# Patient Record
Sex: FEMALE | Race: WHITE
[De-identification: names, ages, dates, MRNs, and addresses within clinical notes are randomized per-mention and may not be internally consistent; named-entity substitution may affect disease eponyms.]

---

## 2020-05-01 ENCOUNTER — HOSPITAL ENCOUNTER (INPATIENT)
Dept: HOSPITAL 26 - MED | Age: 45
LOS: 1 days | Discharge: HOME | DRG: 532 | End: 2020-05-02
Attending: GENERAL PRACTICE | Admitting: GENERAL PRACTICE
Payer: MEDICAID

## 2020-05-01 VITALS — BODY MASS INDEX: 29.89 KG/M2 | HEIGHT: 66 IN | WEIGHT: 186 LBS

## 2020-05-01 VITALS — SYSTOLIC BLOOD PRESSURE: 139 MMHG | DIASTOLIC BLOOD PRESSURE: 85 MMHG

## 2020-05-01 VITALS — SYSTOLIC BLOOD PRESSURE: 114 MMHG | DIASTOLIC BLOOD PRESSURE: 56 MMHG

## 2020-05-01 VITALS — DIASTOLIC BLOOD PRESSURE: 56 MMHG | SYSTOLIC BLOOD PRESSURE: 110 MMHG

## 2020-05-01 VITALS — DIASTOLIC BLOOD PRESSURE: 51 MMHG | SYSTOLIC BLOOD PRESSURE: 111 MMHG

## 2020-05-01 DIAGNOSIS — N93.8: Primary | ICD-10-CM

## 2020-05-01 DIAGNOSIS — Z83.3: ICD-10-CM

## 2020-05-01 DIAGNOSIS — N85.00: ICD-10-CM

## 2020-05-01 DIAGNOSIS — N83.202: ICD-10-CM

## 2020-05-01 DIAGNOSIS — D62: ICD-10-CM

## 2020-05-01 DIAGNOSIS — N83.201: ICD-10-CM

## 2020-05-01 DIAGNOSIS — E66.9: ICD-10-CM

## 2020-05-01 DIAGNOSIS — E11.9: ICD-10-CM

## 2020-05-01 DIAGNOSIS — D50.9: ICD-10-CM

## 2020-05-01 LAB
ANION GAP SERPL CALCULATED.3IONS-SCNC: 11.8 MMOL/L (ref 8–16)
APPEARANCE UR: CLEAR
BARBITURATES UR QL SCN: NEGATIVE NG/ML
BASOPHILS # BLD AUTO: 0.1 K/UL (ref 0–0.22)
BASOPHILS NFR BLD AUTO: 0.7 % (ref 0–2)
BENZODIAZ UR QL SCN: NEGATIVE NG/ML
BILIRUB UR QL STRIP: NEGATIVE
BUN SERPL-MCNC: 7 MG/DL (ref 7–18)
BZE UR QL SCN: NEGATIVE NG/ML
CANNABINOIDS UR QL SCN: NEGATIVE NG/ML
CHLORIDE SERPL-SCNC: 103 MMOL/L (ref 98–107)
CO2 SERPL-SCNC: 29 MMOL/L (ref 21–32)
COLOR UR: YELLOW
CREAT SERPL-MCNC: 0.8 MG/DL (ref 0.6–1.3)
EOSINOPHIL # BLD AUTO: 0.1 K/UL (ref 0–0.4)
EOSINOPHIL NFR BLD AUTO: 1.4 % (ref 0–4)
ERYTHROCYTE [DISTWIDTH] IN BLOOD BY AUTOMATED COUNT: 19.3 % (ref 11.6–13.7)
GFR SERPL CREATININE-BSD FRML MDRD: 100 ML/MIN (ref 90–?)
GLUCOSE SERPL-MCNC: 140 MG/DL (ref 74–106)
GLUCOSE UR STRIP-MCNC: NEGATIVE MG/DL
HCT VFR BLD AUTO: 20.9 % (ref 36–48)
HCT VFR BLD AUTO: 25 % (ref 36–48)
HGB BLD-MCNC: 6.4 G/DL (ref 12–16)
HGB BLD-MCNC: 8.1 G/DL (ref 12–16)
HGB UR QL STRIP: (no result)
LEUKOCYTE ESTERASE UR QL STRIP: NEGATIVE
LYMPHOCYTES # BLD AUTO: 2.4 K/UL (ref 2.5–16.5)
LYMPHOCYTES NFR BLD AUTO: 30.2 % (ref 20.5–51.1)
MAGNESIUM SERPL-MCNC: 1.8 MG/DL (ref 1.8–2.4)
MCH RBC QN AUTO: 23 PG (ref 27–31)
MCHC RBC AUTO-ENTMCNC: 31 G/DL (ref 33–37)
MCV RBC AUTO: 74.4 FL (ref 80–94)
MONOCYTES # BLD AUTO: 0.7 K/UL (ref 0.8–1)
MONOCYTES NFR BLD AUTO: 8.8 % (ref 1.7–9.3)
NEUTROPHILS # BLD AUTO: 4.6 K/UL (ref 1.8–7.7)
NEUTROPHILS NFR BLD AUTO: 58.9 % (ref 42.2–75.2)
NITRITE UR QL STRIP: NEGATIVE
OPIATES UR QL SCN: NEGATIVE NG/ML
PCP UR QL SCN: NEGATIVE NG/ML
PH UR STRIP: 6 [PH] (ref 5–9)
PHOSPHATE SERPL-MCNC: 2.8 MG/DL (ref 2.5–4.9)
PLATELET # BLD AUTO: 348 K/UL (ref 140–450)
POTASSIUM SERPL-SCNC: 3.8 MMOL/L (ref 3.5–5.1)
PROTHROMBIN TIME: 9.9 SECS (ref 10.8–13.4)
RBC # BLD AUTO: 2.81 MIL/UL (ref 4.2–5.4)
RBC #/AREA URNS HPF: (no result) /HPF (ref 0–5)
SODIUM SERPL-SCNC: 140 MMOL/L (ref 136–145)
TSH SERPL DL<=0.05 MIU/L-ACNC: 3.24 UIU/ML (ref 0.34–3.74)
WBC # BLD AUTO: 7.8 K/UL (ref 4.8–10.8)
WBC,URINE: (no result) /HPF (ref 0–5)

## 2020-05-01 PROCEDURE — 30233N1 TRANSFUSION OF NONAUTOLOGOUS RED BLOOD CELLS INTO PERIPHERAL VEIN, PERCUTANEOUS APPROACH: ICD-10-PCS | Performed by: GENERAL PRACTICE

## 2020-05-01 PROCEDURE — P9016 RBC LEUKOCYTES REDUCED: HCPCS

## 2020-05-01 RX ADMIN — SODIUM CHLORIDE SCH MLS/HR: 9 INJECTION, SOLUTION INTRAVENOUS at 15:39

## 2020-05-01 RX ADMIN — FERROUS GLUCONATE SCH MG: 324 TABLET ORAL at 17:20

## 2020-05-01 NOTE — NUR
PATIENT AWAKE AND TALKING ON THE PHONE WITH FAMILY. NO C/O PAIN. NO S/SX ACUTE DISTRESS. 
CALL LIGHT WITHIN REACH. WILL CONTINUE TO MONITOR.

## 2020-05-01 NOTE — NUR
Blood transfusion initated. Two nurses verified. Grover STACK second RN to verify. 
Blood infusing through left ac IV. Pt advised to notify if there is any 
complaints of SOB, rash or adverse affects during the transfusion.

## 2020-05-01 NOTE — NUR
RECEIVED PATIENT IN STABLE CONDITION FROM AM SHIFT NURSE FOR CONTINUITY OF CARE. 
RESPIRATIONS EVEN, UNLABORED. SKIN WARM, DRY. IV SITE NOTED TO LEFT AC 18G PATENT/INTACT, 
INFUSING FLUIDS WELL. NO C/O PAIN. NO S/SX ACUTE DISTRESS. CALL LIGHT WITHIN REACH. WILL 
CONTINUE TO MONITOR.

## 2020-05-01 NOTE — NUR
HOURLY ROUNDING. PT RESTING IN BED AND FACETIMING FAMILY. RESPIRATIONS EVEN AND UNLABORED 
WITH NO SOB OR RESPIRATORY DISTRESS. SKIN WARM AND DRY TO TOUCH. SAFETY MEASURES IN PLACE. 
WILL CONTINUE TO MONITOR

## 2020-05-01 NOTE — NUR
RECEIVED BEDSIDE REPORT FROM ED NURSE. PT RESTING IN BED. ABLE TO MAKE SOME NEEDS KNOWN. 
RESPIRATIONS EVEN AND UNLABORED WITH NO SOB OR RESPIRATORY DISTRESS. SKIN WARM AND DRY TO 
TOUCH. IV IN LAC 18G IS CLEAN, DRY, AND INTACT. MRSA SWAB COLLECTED. VITAL SIGNS: 114/56, 63 
HR, 18 RR, 97.9 TEMP, AND 99% SPO2 ON RA. SAFETY MEASURES IN PLACE. WILL CONTINUE TO MONITOR

## 2020-05-01 NOTE — NUR
VAGINAL BLEEDING X 10 DAYS, BRIGHT RED BLOOD WITH CLOTS.PT AWAKE , ALERT , 
AFIBRILE , AMBULATORY WITH STEADY GAIT, PINK PALPEBRAL CONJUNCTIVAE , ANICTERIC 
SCLERA,SCE , CBS BLF.FLAT SOFT NABS , NONTENDER ABDOMEN.



NKA

NO HX

NO RX

NO N/V/D

## 2020-05-01 NOTE — NUR
PT FINISHED ONE UNIT OF PRBC. PT TOLERATED WELL. NO EPISODES OF REACTIONS OR COMPLICATIONS. 
RESIDENT IS AWARE. SAFETY MEASURES IN PLACE. WILL CONTINUE TO MONITOR

## 2020-05-01 NOTE — NUR
Patient will be admitted to care of Dr Jefferson. Admited to [facility].  Will go to room 
106 b. Belongings list completed.  Report to gilbert alvares.

## 2020-05-01 NOTE — NUR
PATIENT REQUESTED FOR SANITARY PADS, STATED THAT THE BLEEDING HAS DECREASED SIGNIFICANTLY. 
NO C/O PAIN. NO S/SX ACUTE DISTRESS. CALL LIGHT WITHIN REACH. WILL CONTINUE TO MONITOR.

## 2020-05-02 VITALS — DIASTOLIC BLOOD PRESSURE: 59 MMHG | SYSTOLIC BLOOD PRESSURE: 122 MMHG

## 2020-05-02 VITALS — SYSTOLIC BLOOD PRESSURE: 111 MMHG | DIASTOLIC BLOOD PRESSURE: 46 MMHG

## 2020-05-02 VITALS — DIASTOLIC BLOOD PRESSURE: 62 MMHG | SYSTOLIC BLOOD PRESSURE: 126 MMHG

## 2020-05-02 LAB
ANION GAP SERPL CALCULATED.3IONS-SCNC: 10.3 MMOL/L (ref 8–16)
BASOPHILS # BLD AUTO: 0.1 K/UL (ref 0–0.22)
BASOPHILS NFR BLD AUTO: 0.6 % (ref 0–2)
BUN SERPL-MCNC: 7 MG/DL (ref 7–18)
CHLORIDE SERPL-SCNC: 105 MMOL/L (ref 98–107)
CHOLEST/HDLC SERPL: 3.8 {RATIO} (ref 1–4.5)
CO2 SERPL-SCNC: 28.6 MMOL/L (ref 21–32)
CREAT SERPL-MCNC: 0.7 MG/DL (ref 0.6–1.3)
EOSINOPHIL # BLD AUTO: 0.2 K/UL (ref 0–0.4)
EOSINOPHIL NFR BLD AUTO: 1.8 % (ref 0–4)
ERYTHROCYTE [DISTWIDTH] IN BLOOD BY AUTOMATED COUNT: 20.8 % (ref 11.6–13.7)
GFR SERPL CREATININE-BSD FRML MDRD: 117 ML/MIN (ref 90–?)
GLUCOSE SERPL-MCNC: 108 MG/DL (ref 74–106)
HCT VFR BLD AUTO: 25.4 % (ref 36–48)
HDLC SERPL-MCNC: 41 MG/DL (ref 40–60)
HGB BLD-MCNC: 8 G/DL (ref 12–16)
LDLC SERPL CALC-MCNC: 75 MG/DL (ref 60–100)
LYMPHOCYTES # BLD AUTO: 3.4 K/UL (ref 2.5–16.5)
LYMPHOCYTES NFR BLD AUTO: 35.9 % (ref 20.5–51.1)
MCH RBC QN AUTO: 25 PG (ref 27–31)
MCHC RBC AUTO-ENTMCNC: 32 G/DL (ref 33–37)
MCV RBC AUTO: 78.3 FL (ref 80–94)
MONOCYTES # BLD AUTO: 0.6 K/UL (ref 0.8–1)
MONOCYTES NFR BLD AUTO: 6.7 % (ref 1.7–9.3)
NEUTROPHILS # BLD AUTO: 5.2 K/UL (ref 1.8–7.7)
NEUTROPHILS NFR BLD AUTO: 55 % (ref 42.2–75.2)
PLATELET # BLD AUTO: 342 K/UL (ref 140–450)
POTASSIUM SERPL-SCNC: 3.9 MMOL/L (ref 3.5–5.1)
RBC # BLD AUTO: 3.24 MIL/UL (ref 4.2–5.4)
SODIUM SERPL-SCNC: 140 MMOL/L (ref 136–145)
TRIGL SERPL-MCNC: 194 MG/DL (ref 30–150)
WBC # BLD AUTO: 9.4 K/UL (ref 4.8–10.8)

## 2020-05-02 RX ADMIN — SODIUM CHLORIDE SCH MLS/HR: 9 INJECTION, SOLUTION INTRAVENOUS at 01:27

## 2020-05-02 RX ADMIN — FERROUS GLUCONATE SCH MG: 324 TABLET ORAL at 08:32

## 2020-05-02 NOTE — NUR
PATIENT CONTINUES IN STABLE CONDITION. NO C/O PAIN. NO S/SX ACUTE DISTRESS. CALL LIGHT 
WITHIN REACH. WILL CONTINUE TO MONITOR.

## 2020-05-02 NOTE — NUR
RECEIVED REPORT FROM NIGHT SHIFT NURSE, FOR CONTINUITY OF CARE. PT IS IN STABLE CONDITION, 
SITTING ON THE BED TALKING; AA&OX4. L 18G AC IV IS PATENT AND INTACT, AND INFUSING PER 
ORDERS. RESPIRATIONS ARE EVEN AND UNLABORED, BREATHING TO RA. PLAN OF CARE REVIEWED WITH PT. 
SAFETY MEASURES IN PLACE; BED IN LOW POSITION, CALL LIGHT WITHIN REACH. WILL CONTINUE TO 
MONITOR.

## 2020-05-02 NOTE — NUR
PATIENT ASLEEP. CONTINUES IN STABLE CONDITION. NO C/O PAIN. NO S/SX ACUTE DISTRESS. CALL 
LIGHT WITHIN REACH. WILL CONTINUE TO MONITOR.

## 2020-05-02 NOTE — NUR
SCHEDULED MEDICATIONS GIVEN. PT TOLERATED PO MEDS WELL. MEDICATION EDUCATION GIVEN. NO 
DISTRESS NOTED. WILL CONTINUE TO MONITOR.

## 2020-05-02 NOTE — NUR
MADE ROUNDS. PATIENT IS ASLEEP AND CONTINUES IN STABLE CONDITION. NO C/O PAIN. NO S/SX ACUTE 
DISTRESS. CALL LIGHT WITHIN REACH. WILL CONTINUE TO MONITOR.

## 2020-05-02 NOTE — NUR
PT REQUESTED THAT HER MEDICATION BE SENT TO [facility] IN [place] ON [address]; 
DR ORR IS AWARE. PT'S ARM BANDS REMOVED, TELE MONITOR REMOVED, AND IV REMOVED WITH CATHETER 
INTACT. PT'S PAPERWORK SIGNED, AND BELONGINGS IN HER POSSESSION. PT STATED THAT HER  
IS WAITING OUTSIDE FOR HER TO TAKE HER HOME. ESCORTED PT OUT TO LOBBY, WITH A MASK ON; PT IN 
STABLE CONDITION.